# Patient Record
Sex: MALE | Race: WHITE | NOT HISPANIC OR LATINO | Employment: OTHER | URBAN - METROPOLITAN AREA
[De-identification: names, ages, dates, MRNs, and addresses within clinical notes are randomized per-mention and may not be internally consistent; named-entity substitution may affect disease eponyms.]

---

## 2023-12-01 PROBLEM — M17.12 OSTEOARTHRITIS OF LEFT KNEE: Status: ACTIVE | Noted: 2023-12-01

## 2023-12-01 PROBLEM — M25.562 LEFT KNEE PAIN: Status: ACTIVE | Noted: 2023-12-01

## 2023-12-01 PROBLEM — M17.10 ARTHRITIS OF KNEE: Status: ACTIVE | Noted: 2023-12-01

## 2023-12-01 PROBLEM — I10 HYPERTENSION: Status: ACTIVE | Noted: 2023-12-01

## 2023-12-01 PROBLEM — F31.9 BIPOLAR DISORDER WITH DEPRESSION (MULTI): Status: ACTIVE | Noted: 2023-12-01

## 2023-12-01 PROBLEM — E78.5 DYSLIPIDEMIA: Status: ACTIVE | Noted: 2023-12-01

## 2023-12-01 PROBLEM — M54.9 BACK PAIN: Status: ACTIVE | Noted: 2023-12-01

## 2023-12-01 RX ORDER — TESTOSTERONE ENANTHATE 200 MG/ML
200 VIAL (ML) INTRAMUSCULAR
COMMUNITY

## 2023-12-01 RX ORDER — NAPROXEN 500 MG/1
500 TABLET ORAL
COMMUNITY
End: 2023-12-11 | Stop reason: ALTCHOICE

## 2023-12-01 RX ORDER — LISINOPRIL 10 MG/1
10 TABLET ORAL DAILY
COMMUNITY

## 2023-12-01 RX ORDER — GABAPENTIN 800 MG/1
800 TABLET ORAL
COMMUNITY

## 2023-12-11 ENCOUNTER — OFFICE VISIT (OUTPATIENT)
Dept: PRIMARY CARE | Facility: CLINIC | Age: 78
End: 2023-12-11
Payer: MEDICARE

## 2023-12-11 VITALS
BODY MASS INDEX: 30.75 KG/M2 | DIASTOLIC BLOOD PRESSURE: 79 MMHG | HEIGHT: 72 IN | TEMPERATURE: 97.1 F | SYSTOLIC BLOOD PRESSURE: 134 MMHG | HEART RATE: 60 BPM | OXYGEN SATURATION: 96 % | WEIGHT: 227 LBS

## 2023-12-11 DIAGNOSIS — F31.9 BIPOLAR DISORDER WITH DEPRESSION (MULTI): ICD-10-CM

## 2023-12-11 DIAGNOSIS — I10 PRIMARY HYPERTENSION: ICD-10-CM

## 2023-12-11 DIAGNOSIS — Z00.00 ROUTINE GENERAL MEDICAL EXAMINATION AT HEALTH CARE FACILITY: Primary | ICD-10-CM

## 2023-12-11 PROCEDURE — 1036F TOBACCO NON-USER: CPT | Performed by: STUDENT IN AN ORGANIZED HEALTH CARE EDUCATION/TRAINING PROGRAM

## 2023-12-11 PROCEDURE — 1160F RVW MEDS BY RX/DR IN RCRD: CPT | Performed by: STUDENT IN AN ORGANIZED HEALTH CARE EDUCATION/TRAINING PROGRAM

## 2023-12-11 PROCEDURE — 3078F DIAST BP <80 MM HG: CPT | Performed by: STUDENT IN AN ORGANIZED HEALTH CARE EDUCATION/TRAINING PROGRAM

## 2023-12-11 PROCEDURE — 3075F SYST BP GE 130 - 139MM HG: CPT | Performed by: STUDENT IN AN ORGANIZED HEALTH CARE EDUCATION/TRAINING PROGRAM

## 2023-12-11 PROCEDURE — G0439 PPPS, SUBSEQ VISIT: HCPCS | Performed by: STUDENT IN AN ORGANIZED HEALTH CARE EDUCATION/TRAINING PROGRAM

## 2023-12-11 PROCEDURE — 1159F MED LIST DOCD IN RCRD: CPT | Performed by: STUDENT IN AN ORGANIZED HEALTH CARE EDUCATION/TRAINING PROGRAM

## 2023-12-11 PROCEDURE — 1170F FXNL STATUS ASSESSED: CPT | Performed by: STUDENT IN AN ORGANIZED HEALTH CARE EDUCATION/TRAINING PROGRAM

## 2023-12-11 RX ORDER — DICLOFENAC SODIUM 10 MG/G
2 GEL TOPICAL AS NEEDED
COMMUNITY
Start: 2023-05-02

## 2023-12-11 RX ORDER — FLUTICASONE PROPIONATE 50 MCG
2 SPRAY, SUSPENSION (ML) NASAL DAILY
COMMUNITY
Start: 2023-06-15

## 2023-12-11 RX ORDER — HYDROCORTISONE 1 %
CREAM (GRAM) TOPICAL
COMMUNITY
Start: 2023-06-20

## 2023-12-11 SDOH — ECONOMIC STABILITY: FOOD INSECURITY: WITHIN THE PAST 12 MONTHS, THE FOOD YOU BOUGHT JUST DIDN'T LAST AND YOU DIDN'T HAVE MONEY TO GET MORE.: NEVER TRUE

## 2023-12-11 SDOH — SOCIAL STABILITY: SOCIAL INSECURITY
WITHIN THE LAST YEAR, HAVE TO BEEN RAPED OR FORCED TO HAVE ANY KIND OF SEXUAL ACTIVITY BY YOUR PARTNER OR EX-PARTNER?: NO

## 2023-12-11 SDOH — ECONOMIC STABILITY: INCOME INSECURITY: IN THE LAST 12 MONTHS, WAS THERE A TIME WHEN YOU WERE NOT ABLE TO PAY THE MORTGAGE OR RENT ON TIME?: NO

## 2023-12-11 SDOH — ECONOMIC STABILITY: INCOME INSECURITY: HOW HARD IS IT FOR YOU TO PAY FOR THE VERY BASICS LIKE FOOD, HOUSING, MEDICAL CARE, AND HEATING?: NOT HARD AT ALL

## 2023-12-11 SDOH — SOCIAL STABILITY: SOCIAL INSECURITY: WITHIN THE LAST YEAR, HAVE YOU BEEN AFRAID OF YOUR PARTNER OR EX-PARTNER?: NO

## 2023-12-11 SDOH — ECONOMIC STABILITY: HOUSING INSECURITY
IN THE LAST 12 MONTHS, WAS THERE A TIME WHEN YOU DID NOT HAVE A STEADY PLACE TO SLEEP OR SLEPT IN A SHELTER (INCLUDING NOW)?: NO

## 2023-12-11 SDOH — SOCIAL STABILITY: SOCIAL INSECURITY: WITHIN THE LAST YEAR, HAVE YOU BEEN HUMILIATED OR EMOTIONALLY ABUSED IN OTHER WAYS BY YOUR PARTNER OR EX-PARTNER?: NO

## 2023-12-11 SDOH — SOCIAL STABILITY: SOCIAL INSECURITY
WITHIN THE LAST YEAR, HAVE YOU BEEN KICKED, HIT, SLAPPED, OR OTHERWISE PHYSICALLY HURT BY YOUR PARTNER OR EX-PARTNER?: NO

## 2023-12-11 SDOH — ECONOMIC STABILITY: FOOD INSECURITY: WITHIN THE PAST 12 MONTHS, YOU WORRIED THAT YOUR FOOD WOULD RUN OUT BEFORE YOU GOT MONEY TO BUY MORE.: NEVER TRUE

## 2023-12-11 SDOH — ECONOMIC STABILITY: TRANSPORTATION INSECURITY
IN THE PAST 12 MONTHS, HAS LACK OF TRANSPORTATION KEPT YOU FROM MEETINGS, WORK, OR FROM GETTING THINGS NEEDED FOR DAILY LIVING?: NO

## 2023-12-11 SDOH — ECONOMIC STABILITY: TRANSPORTATION INSECURITY
IN THE PAST 12 MONTHS, HAS THE LACK OF TRANSPORTATION KEPT YOU FROM MEDICAL APPOINTMENTS OR FROM GETTING MEDICATIONS?: NO

## 2023-12-11 ASSESSMENT — ACTIVITIES OF DAILY LIVING (ADL)
GROCERY SHOPPING: INDEPENDENT
MANAGING FINANCES: INDEPENDENT
TAKING MEDICATION: INDEPENDENT
ADEQUATE_TO_COMPLETE_ADL: YES
DRESSING: INDEPENDENT
DOING HOUSEWORK: INDEPENDENT
BATHING: INDEPENDENT
USING TELEPHONE: INDEPENDENT
DRESSING: INDEPENDENT
TOILETING: INDEPENDENT
STIL DRIVING: YES
PILL BOX USED: NO
PREPARING MEALS: INDEPENDENT
BATHING: INDEPENDENT
JUDGMENT_ADEQUATE_SAFELY_COMPLETE_DAILY_ACTIVITIES: YES
EATING: INDEPENDENT
FEEDING: INDEPENDENT
USING TRANSPORTATION: INDEPENDENT
NEEDS ASSISTANCE WITH FOOD: INDEPENDENT

## 2023-12-11 ASSESSMENT — ENCOUNTER SYMPTOMS
CONSTIPATION: 0
DEPRESSION: 0
CONFUSION: 0
MUSCULOSKELETAL NEGATIVE: 1
CHILLS: 0
VOMITING: 0
SHORTNESS OF BREATH: 0
PALPITATIONS: 0
DIARRHEA: 0
COUGH: 0
NAUSEA: 0
HEADACHES: 0
DIZZINESS: 0
LOSS OF SENSATION IN FEET: 0
ABDOMINAL PAIN: 0
OCCASIONAL FEELINGS OF UNSTEADINESS: 0
WHEEZING: 0
FATIGUE: 0
COLOR CHANGE: 0
FEVER: 0
UNEXPECTED WEIGHT CHANGE: 0

## 2023-12-11 ASSESSMENT — PATIENT HEALTH QUESTIONNAIRE - PHQ9
2. FEELING DOWN, DEPRESSED OR HOPELESS: NOT AT ALL
1. LITTLE INTEREST OR PLEASURE IN DOING THINGS: NOT AT ALL
SUM OF ALL RESPONSES TO PHQ9 QUESTIONS 1 AND 2: 0

## 2023-12-11 ASSESSMENT — SOCIAL DETERMINANTS OF HEALTH (SDOH): HOW HARD IS IT FOR YOU TO PAY FOR THE VERY BASICS LIKE FOOD, HOUSING, MEDICAL CARE, AND HEATING?: NOT HARD AT ALL

## 2023-12-11 NOTE — PROGRESS NOTES
Subjective   Patient ID: Shaji Guerrero is a 78 y.o. male who presents for Follow-up (Medicare wellness not due till 12/16/23).  He is here for [facility]. Reports he had L knee replacement in 03/2023 and doing very well now. [facility]. Reports he is no longer taking IBU; takes tylenol & gabapentin for pain. Also using testosterone inj biweekly.     Past Medical, Surgical, and Family History reviewed and updated in chart.    Reviewed all medications by prescribing practitioner or clinical pharmacist (such as prescriptions, OTCs, herbal therapies and supplements) and documented in the medical record.    HPI  #HM stuffs  Screening tests:  - Colonoscopy (age 45-75): few [facility] and was told he doesn't need more.   - PSA (age 50 and older): scrn current.   - Lipid profile: UTD     Primary prevention:  - Flu shot: UTD   - RSV (age > 60): rec to get at [facility]/local pharmacy   - COVID vaccines: UTD   - Tdap shot: rec to get at [facility]   - Shingles shot (age >50): UTD   - Prevnar 20: UTD per pt  - Statin (age 40-65 or high risk): not taking     Counseling:   - ETOH (age>18):  occa.   - Smoking: none       Patient Care Team:  Bharat Lr MD as PCP - General  Bharat Lr MD as PCP - [employer] P[facility]     Review of Systems   Constitutional:  Negative for chills, fatigue, fever and unexpected weight change.   HENT: Negative.     Respiratory:  Negative for cough, shortness of breath and wheezing.    Cardiovascular:  Negative for chest pain, palpitations and leg swelling.   Gastrointestinal:  Negative for abdominal pain, constipation, diarrhea, nausea and vomiting.   Musculoskeletal: Negative.    Skin:  Negative for color change and rash.   Neurological:  Negative for dizziness and headaches.   Psychiatric/Behavioral:  Negative for behavioral problems and confusion.        Objective   Vitals:  /79 (BP Location: Right arm, Patient Position: Sitting, BP Cuff Size: Large adult)    Pulse 60   Temp 36.2 °C (97.1 °F)   Ht 1.829 m (6')   Wt 103 kg (227 lb)   SpO2 96%   BMI 30.79 kg/m²       Physical Exam  Vitals and nursing note reviewed.   Constitutional:       Appearance: Normal appearance.   HENT:      Right Ear: Tympanic membrane normal.      Left Ear: Tympanic membrane normal.   Eyes:      Extraocular Movements: Extraocular movements intact.      Pupils: Pupils are equal, round, and reactive to light.   Cardiovascular:      Rate and Rhythm: Normal rate and regular rhythm.      Pulses: Normal pulses.      Heart sounds: Normal heart sounds.   Pulmonary:      Effort: Pulmonary effort is normal. No respiratory distress.      Breath sounds: Normal breath sounds.   Abdominal:      General: Abdomen is flat. Bowel sounds are normal.      Palpations: Abdomen is soft.   Musculoskeletal:         General: Normal range of motion.   Neurological:      General: No focal deficit present.      Mental Status: He is alert and oriented to person, place, and time.      Cranial Nerves: No cranial nerve deficit.      Sensory: No sensory deficit.      Motor: No weakness.   Psychiatric:         Mood and Affect: Mood normal.         Behavior: Behavior normal.       Assessment/Plan   He is here for [facility]. Overall doing okay, no major concerns today and is clinically & vitally stable. Plan as follows      # [facility] wellness # HM visit   - will work on POA/living will paper work   - UTD on immunization per emr; [facility].   - UTD on age appropriate screenings as listed above in [facility] summary   - refer [facility] summary above for detail  - BW from [facility] reviewed and is scanned into the system. ordered as listed in emr      Problem List Items Addressed This Visit       Bipolar disorder with depression (CMS/HCC)    Current Assessment & Plan     Controlled with gabapentin. [facility] at [facility] as [facility].          Hypertension     Other Visit Diagnoses       Routine general medical examination at health care facility    -   Primary          Rtc 12 mo for MCW or earlier as needed    Bharat Lr MD   [facility], [facility]

## 2024-12-11 ENCOUNTER — APPOINTMENT (OUTPATIENT)
Dept: PRIMARY CARE | Facility: CLINIC | Age: 79
End: 2024-12-11
Payer: MEDICARE

## 2024-12-11 VITALS
RESPIRATION RATE: 15 BRPM | SYSTOLIC BLOOD PRESSURE: 132 MMHG | OXYGEN SATURATION: 87 % | HEIGHT: 72 IN | WEIGHT: 223.5 LBS | DIASTOLIC BLOOD PRESSURE: 82 MMHG | HEART RATE: 80 BPM | BODY MASS INDEX: 30.27 KG/M2 | TEMPERATURE: 97.1 F

## 2024-12-11 DIAGNOSIS — Z00.00 ROUTINE GENERAL MEDICAL EXAMINATION AT HEALTH CARE FACILITY: Primary | ICD-10-CM

## 2024-12-11 DIAGNOSIS — Z71.89 ACP (ADVANCE CARE PLANNING): ICD-10-CM

## 2024-12-11 PROBLEM — F31.9 BIPOLAR DISORDER WITH DEPRESSION (MULTI): Status: RESOLVED | Noted: 2023-12-01 | Resolved: 2024-12-11

## 2024-12-11 PROCEDURE — 1170F FXNL STATUS ASSESSED: CPT | Performed by: STUDENT IN AN ORGANIZED HEALTH CARE EDUCATION/TRAINING PROGRAM

## 2024-12-11 PROCEDURE — 1123F ACP DISCUSS/DSCN MKR DOCD: CPT | Performed by: STUDENT IN AN ORGANIZED HEALTH CARE EDUCATION/TRAINING PROGRAM

## 2024-12-11 PROCEDURE — 1160F RVW MEDS BY RX/DR IN RCRD: CPT | Performed by: STUDENT IN AN ORGANIZED HEALTH CARE EDUCATION/TRAINING PROGRAM

## 2024-12-11 PROCEDURE — 1159F MED LIST DOCD IN RCRD: CPT | Performed by: STUDENT IN AN ORGANIZED HEALTH CARE EDUCATION/TRAINING PROGRAM

## 2024-12-11 PROCEDURE — G0439 PPPS, SUBSEQ VISIT: HCPCS | Performed by: STUDENT IN AN ORGANIZED HEALTH CARE EDUCATION/TRAINING PROGRAM

## 2024-12-11 PROCEDURE — 1126F AMNT PAIN NOTED NONE PRSNT: CPT | Performed by: STUDENT IN AN ORGANIZED HEALTH CARE EDUCATION/TRAINING PROGRAM

## 2024-12-11 PROCEDURE — 1036F TOBACCO NON-USER: CPT | Performed by: STUDENT IN AN ORGANIZED HEALTH CARE EDUCATION/TRAINING PROGRAM

## 2024-12-11 PROCEDURE — 3079F DIAST BP 80-89 MM HG: CPT | Performed by: STUDENT IN AN ORGANIZED HEALTH CARE EDUCATION/TRAINING PROGRAM

## 2024-12-11 PROCEDURE — 3075F SYST BP GE 130 - 139MM HG: CPT | Performed by: STUDENT IN AN ORGANIZED HEALTH CARE EDUCATION/TRAINING PROGRAM

## 2024-12-11 PROCEDURE — 99497 ADVNCD CARE PLAN 30 MIN: CPT | Performed by: STUDENT IN AN ORGANIZED HEALTH CARE EDUCATION/TRAINING PROGRAM

## 2024-12-11 RX ORDER — MONTELUKAST SODIUM 10 MG/1
10 TABLET ORAL NIGHTLY
COMMUNITY

## 2024-12-11 SDOH — ECONOMIC STABILITY: FOOD INSECURITY: WITHIN THE PAST 12 MONTHS, YOU WORRIED THAT YOUR FOOD WOULD RUN OUT BEFORE YOU GOT MONEY TO BUY MORE.: NEVER TRUE

## 2024-12-11 SDOH — ECONOMIC STABILITY: FOOD INSECURITY: WITHIN THE PAST 12 MONTHS, THE FOOD YOU BOUGHT JUST DIDN'T LAST AND YOU DIDN'T HAVE MONEY TO GET MORE.: NEVER TRUE

## 2024-12-11 ASSESSMENT — ENCOUNTER SYMPTOMS
VOMITING: 0
CONSTIPATION: 0
CONFUSION: 0
UNEXPECTED WEIGHT CHANGE: 0
LOSS OF SENSATION IN FEET: 0
DIZZINESS: 0
FATIGUE: 0
COUGH: 0
NAUSEA: 0
DIARRHEA: 0
PALPITATIONS: 0
OCCASIONAL FEELINGS OF UNSTEADINESS: 0
HEADACHES: 0
SHORTNESS OF BREATH: 0
WHEEZING: 0
MUSCULOSKELETAL NEGATIVE: 1
CHILLS: 0
COLOR CHANGE: 0
FEVER: 0
ABDOMINAL PAIN: 0
DEPRESSION: 0

## 2024-12-11 ASSESSMENT — PATIENT HEALTH QUESTIONNAIRE - PHQ9
1. LITTLE INTEREST OR PLEASURE IN DOING THINGS: NOT AT ALL
2. FEELING DOWN, DEPRESSED OR HOPELESS: NOT AT ALL
SUM OF ALL RESPONSES TO PHQ9 QUESTIONS 1 & 2: 0

## 2024-12-11 ASSESSMENT — ACTIVITIES OF DAILY LIVING (ADL)
TAKING_MEDICATION: INDEPENDENT
MANAGING_FINANCES: INDEPENDENT
DOING_HOUSEWORK: INDEPENDENT
BATHING: INDEPENDENT
GROCERY_SHOPPING: INDEPENDENT
DRESSING: INDEPENDENT

## 2024-12-11 ASSESSMENT — PAIN SCALES - GENERAL: PAINLEVEL_OUTOF10: 0-NO PAIN

## 2024-12-11 ASSESSMENT — LIFESTYLE VARIABLES
SKIP TO QUESTIONS 9-10: 1
HOW OFTEN DO YOU HAVE SIX OR MORE DRINKS ON ONE OCCASION: NEVER
AUDIT-C TOTAL SCORE: 0
HOW MANY STANDARD DRINKS CONTAINING ALCOHOL DO YOU HAVE ON A TYPICAL DAY: 1 OR 2
HOW OFTEN DO YOU HAVE A DRINK CONTAINING ALCOHOL: NEVER

## 2024-12-11 NOTE — PROGRESS NOTES
Subjective   Patient ID: Shaji Guerrero is a 79 y.o. male who presents for Medicare Annual Wellness Visit Subsequent and Follow-up (No concerns , pt already received flu vaccine ).    Subjective   Reason for Visit: Shaji Guerrero is an 79 y.o. male here for a Medicare Wellness visit.  Reports he is doing well, no acute illness.  [facility].     Past Medical, Surgical, and Family History reviewed and updated in chart.    Reviewed all medications by prescribing practitioner or clinical pharmacist (such as prescriptions, OTCs, herbal therapies and supplements) and documented in the medical record.    HPI  #HM stuffs  Screening tests:  - Colonoscopy (age 45-75): few yrs ago done at [facility] and was told he doesn't need more.   - PSA (age 50 and older): UTD per pt, done at [facility]; will bring records/drop at N[facility].   - Lipid profile: per pt few mo ago; has another bld work in Jan/25.      Primary prevention:  - Flu shot: UTD, came with vaccination records   - RSV (age > 60): UTD  - COVID vaccines: UTD   - Tdap shot: UTD  - Shingles shot (age >50): UTD   - Prevnar 20: UTD   - Statin (age 40-65 or high risk): not taking      Counseling:   - ETOH (age>18):  occa.   - Smoking: none      Patient Care Team:  Bharat Lr MD as PCP - General  Bharat Lr MD as PCP - [employer] PCP     Review of Systems   Constitutional:  Negative for chills, fatigue, fever and unexpected weight change.   HENT: Negative.     Respiratory:  Negative for cough, shortness of breath and wheezing.    Cardiovascular:  Negative for chest pain, palpitations and leg swelling.   Gastrointestinal:  Negative for abdominal pain, constipation, diarrhea, nausea and vomiting.   Musculoskeletal: Negative.    Skin:  Negative for color change and rash.   Neurological:  Negative for dizziness and headaches.   Psychiatric/Behavioral:  Negative for behavioral problems and confusion.        Objective   Vitals:  /82 (BP  Location: Left arm, Patient Position: Sitting, BP Cuff Size: Large adult)   Pulse 80   Temp 36.2 °C (97.1 °F) (Temporal)   Resp 15   Ht 1.829 m (6')   Wt 101 kg (223 lb 8 oz)   SpO2 (!) 87%   BMI 30.31 kg/m²       Physical Exam  Vitals and nursing note reviewed.   Constitutional:       Appearance: Normal appearance. He is obese.   HENT:      Right Ear: Tympanic membrane normal.      Left Ear: Tympanic membrane normal.   Eyes:      Extraocular Movements: Extraocular movements intact.      Pupils: Pupils are equal, round, and reactive to light.   Cardiovascular:      Rate and Rhythm: Normal rate and regular rhythm.      Pulses: Normal pulses.      Heart sounds: Normal heart sounds.   Pulmonary:      Effort: Pulmonary effort is normal. No respiratory distress.      Breath sounds: Normal breath sounds.   Abdominal:      General: Abdomen is flat. Bowel sounds are normal.      Palpations: Abdomen is soft.   Musculoskeletal:         General: Normal range of motion.   Neurological:      General: No focal deficit present.      Mental Status: He is alert.      Cranial Nerves: No cranial nerve deficit.      Sensory: No sensory deficit.      Motor: No weakness.   Psychiatric:         Mood and Affect: Mood normal.         Behavior: Behavior normal.       Assessment & Plan  Routine general medical examination at health care facility  # [facility] wellness # HM visit   - Discussed ACP with pt; will work on POA/living will paper work   - Immunization per emr: UTD [facility] at [facility]  - Age appropriate screenings as listed above in [facility] summary   - refer [facility] summary above for detail  - will drop BW result from [facility]       Orders:    1 Year Follow Up In Primary Care - Wellness Exam; Future    ACP (advance care planning)           Advance Directives Discussion  16 - 20 minutes were spent discussing Advanced Care Planning (including a Living Will, Medical Power Of , as well as specific end of life choices and/or directives). The  details of that discussion were documented in Advanced Directives Discussion section of the medical record.      Rtc 12 mo for PATO Lr MD    Mk, Family Medicine

## 2025-12-16 ENCOUNTER — APPOINTMENT (OUTPATIENT)
Dept: PRIMARY CARE | Facility: CLINIC | Age: 80
End: 2025-12-16
Payer: MEDICARE